# Patient Record
Sex: FEMALE | Race: WHITE | NOT HISPANIC OR LATINO | Employment: UNEMPLOYED | URBAN - METROPOLITAN AREA
[De-identification: names, ages, dates, MRNs, and addresses within clinical notes are randomized per-mention and may not be internally consistent; named-entity substitution may affect disease eponyms.]

---

## 2024-11-15 ENCOUNTER — APPOINTMENT (OUTPATIENT)
Dept: RADIOLOGY | Facility: HOSPITAL | Age: 84
End: 2024-11-15
Payer: MEDICARE

## 2024-11-15 ENCOUNTER — HOSPITAL ENCOUNTER (EMERGENCY)
Facility: HOSPITAL | Age: 84
Discharge: HOME | End: 2024-11-16
Attending: EMERGENCY MEDICINE
Payer: MEDICARE

## 2024-11-15 DIAGNOSIS — M54.2 NECK PAIN: Primary | ICD-10-CM

## 2024-11-15 PROCEDURE — 72125 CT NECK SPINE W/O DYE: CPT

## 2024-11-15 PROCEDURE — 99284 EMERGENCY DEPT VISIT MOD MDM: CPT | Mod: 25

## 2024-11-15 RX ORDER — OXYCODONE AND ACETAMINOPHEN 5; 325 MG/1; MG/1
1 TABLET ORAL ONCE
Status: COMPLETED | OUTPATIENT
Start: 2024-11-15 | End: 2024-11-16

## 2024-11-15 ASSESSMENT — COLUMBIA-SUICIDE SEVERITY RATING SCALE - C-SSRS
6. HAVE YOU EVER DONE ANYTHING, STARTED TO DO ANYTHING, OR PREPARED TO DO ANYTHING TO END YOUR LIFE?: NO
1. IN THE PAST MONTH, HAVE YOU WISHED YOU WERE DEAD OR WISHED YOU COULD GO TO SLEEP AND NOT WAKE UP?: NO
2. HAVE YOU ACTUALLY HAD ANY THOUGHTS OF KILLING YOURSELF?: NO

## 2024-11-16 VITALS
DIASTOLIC BLOOD PRESSURE: 67 MMHG | RESPIRATION RATE: 18 BRPM | OXYGEN SATURATION: 97 % | SYSTOLIC BLOOD PRESSURE: 133 MMHG | BODY MASS INDEX: 20.56 KG/M2 | HEIGHT: 59 IN | TEMPERATURE: 97.9 F | HEART RATE: 89 BPM | WEIGHT: 102 LBS

## 2024-11-16 PROCEDURE — 2500000001 HC RX 250 WO HCPCS SELF ADMINISTERED DRUGS (ALT 637 FOR MEDICARE OP): Performed by: EMERGENCY MEDICINE

## 2024-11-16 PROCEDURE — 72125 CT NECK SPINE W/O DYE: CPT | Performed by: STUDENT IN AN ORGANIZED HEALTH CARE EDUCATION/TRAINING PROGRAM

## 2024-11-16 RX ORDER — OXYCODONE HYDROCHLORIDE 5 MG/1
5 TABLET ORAL EVERY 8 HOURS PRN
Qty: 6 TABLET | Refills: 0 | Status: SHIPPED | OUTPATIENT
Start: 2024-11-16 | End: 2024-11-19

## 2024-11-16 RX ADMIN — OXYCODONE HYDROCHLORIDE AND ACETAMINOPHEN 1 TABLET: 5; 325 TABLET ORAL at 00:08

## 2024-11-16 ASSESSMENT — PAIN DESCRIPTION - LOCATION
LOCATION: NECK
LOCATION: NECK

## 2024-11-16 ASSESSMENT — LIFESTYLE VARIABLES
TOTAL SCORE: 0
EVER HAD A DRINK FIRST THING IN THE MORNING TO STEADY YOUR NERVES TO GET RID OF A HANGOVER: NO
HAVE YOU EVER FELT YOU SHOULD CUT DOWN ON YOUR DRINKING: NO
EVER FELT BAD OR GUILTY ABOUT YOUR DRINKING: NO
HAVE PEOPLE ANNOYED YOU BY CRITICIZING YOUR DRINKING: NO

## 2024-11-16 ASSESSMENT — PAIN SCALES - GENERAL
PAINLEVEL_OUTOF10: 6
PAINLEVEL_OUTOF10: 3

## 2024-11-16 ASSESSMENT — PAIN - FUNCTIONAL ASSESSMENT: PAIN_FUNCTIONAL_ASSESSMENT: 0-10

## 2024-11-16 NOTE — ED PROVIDER NOTES
"Limitations to History: None     HPI:      Clementine Macario is a 83 y.o. who presents to the ED with neck pain.  She says she slept in the chair about a week ago without a back it was all crumpled up and had a lot of pain then.  She then was walking earlier in the week stumbled and snapped her neck backwards which caused a lot of pain as well.  Pain radiates down the middle of her back, does not go down either of her arm she denies any burning sensation in her upper extremities.  She is never had pain like this before.  She did not actually fall to the ground did not strike anything.  She is not on anticoagulation.  She denies Tylenol helping at home which is what she has been taking and she has no weakness in her bilateral upper extremities..     ------------------------------------------------------------------------------------------------------------------------------------------    VS: /62 (BP Location: Right arm, Patient Position: Lying)   Temp 36.6 °C (97.9 °F) (Temporal)   Resp 16   Ht 1.499 m (4' 11\")   Wt 46.3 kg (102 lb)   SpO2 94%   BMI 20.60 kg/m²     Physical Exam:  Gen: Alert, NAD  Head/Neck: NCAT, slightly limited ROM 2/2 to pain, mild C2-3 midline TTP without stepoffs  Eyes: EOMI, PERRL, anicteric sclerae, noninjected conjunctivae  Mouth:  MMM, no OP lesions noted  Heart: RRR no MRG  Lungs: CTA b/l no RRW, no increased work of breathing  Abdomen: soft, NT, ND, no HSM, no palpable masses  Musculoskeletal: no joint swelling noted  Extremities: WWP, no c/c/e, cap refill <2sec  Neurologic: Alert, symmetrical facies, phonates clearly, moves all extremities equally, responsive to touch  Skin: no rashes noted  Psychological: calm, no SI/HI      ------------------------------------------------------------------------------------------------------------------------------------------    Medical Decision Making: Patient presents emerged from with neck pain after a few mechanisms as an outpatient, CT " scan does not have any acute abnormalities, she does not have any evidence of fracture.  The patient has no neurovascular deficits on my exam and moves her upper extremities with ease, no concern for central cord.  Patient got better with a Percocet here.  We discussed the risks and benefits of narcotics at her age including leading to falls and confusion.  She does have family will check on her, so was discharged with a very limited course of oxycodone    Diagnoses as of 11/16/24 0153   Neck pain       Medications   oxyCODONE-acetaminophen (Percocet) 5-325 mg per tablet 1 tablet (has no administration in time range)          Dexter Hassan MD  11/16/24 0155

## 2024-11-16 NOTE — DISCHARGE INSTRUCTIONS
You are being discharged home today with narcotic pain medication.  Please take this only when the pain is severe and only as instructed.  You should not drive, operate machinery or be responsible for small children while on these narcotics.  Please do not mix them with other sedating medications including benzodiazepines, alcohol, or muscle relaxers.    Please have her family check in with you for the next few days as elderly patients do not always do well with narcotic pain medications.  You can continue to use Tylenol for this pain and just use this medicine when you are feeling that your pain is out of control.    Return to ER immediately for worsening of symptoms, numbness or weakness to legs, numbness to groin, loss of control of bowel or bladder (including incontinence), inability to urinate, blood in your urine, fever, or abdominal pain.

## 2024-11-16 NOTE — ED TRIAGE NOTES
Pt brought in via ems stating pt is from home and had slummed over her chair about a week ago and is today feeling some neck and shoulder pain. Per ems, pt denied hitting her head or having anything other pain.